# Patient Record
Sex: MALE | Race: WHITE
[De-identification: names, ages, dates, MRNs, and addresses within clinical notes are randomized per-mention and may not be internally consistent; named-entity substitution may affect disease eponyms.]

---

## 2019-06-17 ENCOUNTER — HOSPITAL ENCOUNTER (EMERGENCY)
Dept: HOSPITAL 8 - ED | Age: 69
Discharge: HOME | End: 2019-06-17
Payer: COMMERCIAL

## 2019-06-17 VITALS — HEIGHT: 69 IN | BODY MASS INDEX: 26.78 KG/M2 | WEIGHT: 180.78 LBS

## 2019-06-17 VITALS — DIASTOLIC BLOOD PRESSURE: 76 MMHG | SYSTOLIC BLOOD PRESSURE: 128 MMHG

## 2019-06-17 DIAGNOSIS — Z59.0: ICD-10-CM

## 2019-06-17 DIAGNOSIS — F17.200: ICD-10-CM

## 2019-06-17 DIAGNOSIS — M51.36: Primary | ICD-10-CM

## 2019-06-17 DIAGNOSIS — M25.561: ICD-10-CM

## 2019-06-17 DIAGNOSIS — Z72.9: ICD-10-CM

## 2019-06-17 DIAGNOSIS — M25.562: ICD-10-CM

## 2019-06-17 DIAGNOSIS — G89.29: ICD-10-CM

## 2019-06-17 PROCEDURE — 73564 X-RAY EXAM KNEE 4 OR MORE: CPT

## 2019-06-17 PROCEDURE — 96372 THER/PROPH/DIAG INJ SC/IM: CPT

## 2019-06-17 PROCEDURE — 99283 EMERGENCY DEPT VISIT LOW MDM: CPT

## 2019-06-17 PROCEDURE — 72110 X-RAY EXAM L-2 SPINE 4/>VWS: CPT

## 2019-06-17 SDOH — ECONOMIC STABILITY - HOUSING INSECURITY: HOMELESSNESS: Z59.0

## 2019-06-17 NOTE — NUR
PT C/O INCR BILAT KNEE PAIN X6MOS AND BILATERAL LOWER BACK PAIN, UNABLE TO HAVE 
TKA YET, DENIES INJURY/TRAUMA. PT'S AOX4. RESPS EVEN AND UNLABORED.

## 2019-06-17 NOTE — NUR
PT GIVEN DC INSTRUCTIONS AND SCRUPTS. PT EDUCATED REGARDING DC MEDICATIONS. PT 
AMB TO DC WITH STEADY GAIT. NO ACUTE DISTRESS AT DC.

## 2019-09-09 ENCOUNTER — HOSPITAL ENCOUNTER (EMERGENCY)
Dept: HOSPITAL 8 - ED | Age: 69
LOS: 1 days | Discharge: HOME | End: 2019-09-10
Payer: MEDICARE

## 2019-09-09 VITALS — WEIGHT: 161.6 LBS | HEIGHT: 69 IN | BODY MASS INDEX: 23.93 KG/M2

## 2019-09-09 VITALS — SYSTOLIC BLOOD PRESSURE: 109 MMHG | DIASTOLIC BLOOD PRESSURE: 72 MMHG

## 2019-09-09 DIAGNOSIS — L03.115: Primary | ICD-10-CM

## 2019-09-09 DIAGNOSIS — Z72.9: ICD-10-CM

## 2019-09-09 DIAGNOSIS — M79.661: ICD-10-CM

## 2019-09-09 LAB
ALBUMIN SERPL-MCNC: 3.5 G/DL (ref 3.4–5)
ALP SERPL-CCNC: 127 U/L (ref 45–117)
ALT SERPL-CCNC: 34 U/L (ref 12–78)
ANION GAP SERPL CALC-SCNC: 5 MMOL/L (ref 5–15)
BASOPHILS # BLD AUTO: 0.01 X10^3/UL (ref 0–0.1)
BASOPHILS NFR BLD AUTO: 0 % (ref 0–1)
BILIRUB SERPL-MCNC: 0.6 MG/DL (ref 0.2–1)
CALCIUM SERPL-MCNC: 8.4 MG/DL (ref 8.5–10.1)
CHLORIDE SERPL-SCNC: 103 MMOL/L (ref 98–107)
CREAT SERPL-MCNC: 1.1 MG/DL (ref 0.7–1.3)
EOSINOPHIL # BLD AUTO: 0.1 X10^3/UL (ref 0–0.4)
EOSINOPHIL NFR BLD AUTO: 1 % (ref 1–7)
ERYTHROCYTE [DISTWIDTH] IN BLOOD BY AUTOMATED COUNT: 13.1 % (ref 9.4–14.8)
LYMPHOCYTES # BLD AUTO: 1.25 X10^3/UL (ref 1–3.4)
LYMPHOCYTES NFR BLD AUTO: 9 % (ref 22–44)
MCH RBC QN AUTO: 33 PG (ref 27.5–34.5)
MCHC RBC AUTO-ENTMCNC: 33.1 G/DL (ref 33.2–36.2)
MCV RBC AUTO: 99.8 FL (ref 81–97)
MD: (no result)
MONOCYTES # BLD AUTO: 1.77 X10^3/UL (ref 0.2–0.8)
MONOCYTES NFR BLD AUTO: 12 % (ref 2–9)
NEUTROPHILS # BLD AUTO: 11.37 X10^3/UL (ref 1.8–6.8)
NEUTROPHILS NFR BLD AUTO: 78 % (ref 42–75)
PLATELET # BLD AUTO: 300 X10^3/UL (ref 130–400)
PMV BLD AUTO: 7.7 FL (ref 7.4–10.4)
PROT SERPL-MCNC: 6.9 G/DL (ref 6.4–8.2)
RBC # BLD AUTO: 4.48 X10^6/UL (ref 4.38–5.82)

## 2019-09-09 PROCEDURE — 80053 COMPREHEN METABOLIC PANEL: CPT

## 2019-09-09 PROCEDURE — 83605 ASSAY OF LACTIC ACID: CPT

## 2019-09-09 PROCEDURE — 85025 COMPLETE CBC W/AUTO DIFF WBC: CPT

## 2019-09-09 PROCEDURE — 99284 EMERGENCY DEPT VISIT MOD MDM: CPT

## 2019-09-09 PROCEDURE — 36415 COLL VENOUS BLD VENIPUNCTURE: CPT

## 2019-09-09 PROCEDURE — 87040 BLOOD CULTURE FOR BACTERIA: CPT

## 2019-09-09 NOTE — NUR
Patient to room, asked patient to remove shirt and place on gown. Patient 
agreeable. Patient says right shin hurts, right shin has a 2cm x 2cm scab in 
place with a raised edemetous tender and hot to the touch swollen area 
approximately 10cm in diameter. Patient attached to monitor, VSS. At completion 
of assessment pa to bedside. orders placed and received. Patient given pain 
medication per mar. Phlebotomy at bedside, awaiting blood culture results.